# Patient Record
Sex: FEMALE | Race: WHITE
[De-identification: names, ages, dates, MRNs, and addresses within clinical notes are randomized per-mention and may not be internally consistent; named-entity substitution may affect disease eponyms.]

---

## 2019-09-23 ENCOUNTER — HOSPITAL ENCOUNTER (INPATIENT)
Dept: HOSPITAL 54 - ER | Age: 54
LOS: 3 days | Discharge: HOME | DRG: 101 | End: 2019-09-26
Attending: NURSE PRACTITIONER | Admitting: INTERNAL MEDICINE
Payer: SELF-PAY

## 2019-09-23 VITALS — SYSTOLIC BLOOD PRESSURE: 96 MMHG | DIASTOLIC BLOOD PRESSURE: 62 MMHG

## 2019-09-23 VITALS — HEIGHT: 65 IN | BODY MASS INDEX: 23.51 KG/M2 | WEIGHT: 141.12 LBS

## 2019-09-23 DIAGNOSIS — K70.10: ICD-10-CM

## 2019-09-23 DIAGNOSIS — L89.890: ICD-10-CM

## 2019-09-23 DIAGNOSIS — E83.42: ICD-10-CM

## 2019-09-23 DIAGNOSIS — G40.509: Primary | ICD-10-CM

## 2019-09-23 DIAGNOSIS — K74.60: ICD-10-CM

## 2019-09-23 DIAGNOSIS — D68.9: ICD-10-CM

## 2019-09-23 DIAGNOSIS — E44.1: ICD-10-CM

## 2019-09-23 DIAGNOSIS — D61.818: ICD-10-CM

## 2019-09-23 DIAGNOSIS — D75.89: ICD-10-CM

## 2019-09-23 DIAGNOSIS — I69.359: ICD-10-CM

## 2019-09-23 DIAGNOSIS — F10.239: ICD-10-CM

## 2019-09-23 DIAGNOSIS — G93.89: ICD-10-CM

## 2019-09-23 LAB
ALBUMIN SERPL BCP-MCNC: 3.1 G/DL (ref 3.4–5)
ALP SERPL-CCNC: 222 U/L (ref 46–116)
ALT SERPL W P-5'-P-CCNC: 49 U/L (ref 12–78)
AST SERPL W P-5'-P-CCNC: 117 U/L (ref 15–37)
BASOPHILS # BLD AUTO: 0 /CMM (ref 0–0.2)
BASOPHILS NFR BLD AUTO: 1.2 % (ref 0–2)
BILIRUB DIRECT SERPL-MCNC: 1 MG/DL (ref 0–0.2)
BILIRUB SERPL-MCNC: 2.2 MG/DL (ref 0.2–1)
BUN SERPL-MCNC: 5 MG/DL (ref 7–18)
CALCIUM SERPL-MCNC: 8.7 MG/DL (ref 8.5–10.1)
CHLORIDE SERPL-SCNC: 106 MMOL/L (ref 98–107)
CO2 SERPL-SCNC: 26 MMOL/L (ref 21–32)
CREAT SERPL-MCNC: 0.5 MG/DL (ref 0.6–1.3)
EOSINOPHIL NFR BLD AUTO: 1.5 % (ref 0–6)
ETHANOL SERPL-MCNC: 12 MG/DL (ref 0–0)
GLUCOSE SERPL-MCNC: 102 MG/DL (ref 74–106)
HCT VFR BLD AUTO: 34 % (ref 33–45)
HGB BLD-MCNC: 11.6 G/DL (ref 11.5–14.8)
LYMPHOCYTES NFR BLD AUTO: 0.4 /CMM (ref 0.8–4.8)
LYMPHOCYTES NFR BLD AUTO: 15.5 % (ref 20–44)
LYMPHOCYTES NFR BLD MANUAL: 13 % (ref 16–48)
MCHC RBC AUTO-ENTMCNC: 34 G/DL (ref 31–36)
MCV RBC AUTO: 102 FL (ref 82–100)
MONOCYTES NFR BLD AUTO: 0.3 /CMM (ref 0.1–1.3)
MONOCYTES NFR BLD AUTO: 11 % (ref 2–12)
MONOCYTES NFR BLD MANUAL: 7 % (ref 0–11)
NEUTROPHILS # BLD AUTO: 1.8 /CMM (ref 1.8–8.9)
NEUTROPHILS NFR BLD AUTO: 70.8 % (ref 43–81)
NEUTS SEG NFR BLD MANUAL: 80 % (ref 42–76)
PLATELET # BLD AUTO: 71 /CMM (ref 150–450)
POTASSIUM SERPL-SCNC: 3.3 MMOL/L (ref 3.5–5.1)
PROT SERPL-MCNC: 7.6 G/DL (ref 6.4–8.2)
RBC # BLD AUTO: 3.35 MIL/UL (ref 4–5.2)
SODIUM SERPL-SCNC: 142 MMOL/L (ref 136–145)
WBC NRBC COR # BLD AUTO: 2.6 K/UL (ref 4.3–11)

## 2019-09-23 PROCEDURE — G0480 DRUG TEST DEF 1-7 CLASSES: HCPCS

## 2019-09-23 PROCEDURE — G0378 HOSPITAL OBSERVATION PER HR: HCPCS

## 2019-09-23 RX ADMIN — LEVETIRACETAM SCH MG: 250 TABLET, FILM COATED ORAL at 23:39

## 2019-09-23 RX ADMIN — Medication PRN EACH: at 23:41

## 2019-09-23 RX ADMIN — FOLIC ACID SCH MG: 1 TABLET ORAL at 23:39

## 2019-09-23 RX ADMIN — Medication SCH MG: at 23:39

## 2019-09-23 RX ADMIN — DEXTROSE AND SODIUM CHLORIDE PRN MLS/HR: 5; 450 INJECTION, SOLUTION INTRAVENOUS at 23:39

## 2019-09-23 NOTE — NUR
"[id number] FROM [facility], PT GIVEN 5MG VERSED PER EMS." PT 
TO BED 7, PT ON MONITOR, VSS, NAD NOTED, PENDIGN MD ORR

## 2019-09-24 VITALS — DIASTOLIC BLOOD PRESSURE: 85 MMHG | SYSTOLIC BLOOD PRESSURE: 141 MMHG

## 2019-09-24 VITALS — SYSTOLIC BLOOD PRESSURE: 127 MMHG | DIASTOLIC BLOOD PRESSURE: 88 MMHG

## 2019-09-24 VITALS — DIASTOLIC BLOOD PRESSURE: 65 MMHG | SYSTOLIC BLOOD PRESSURE: 101 MMHG

## 2019-09-24 VITALS — SYSTOLIC BLOOD PRESSURE: 94 MMHG | DIASTOLIC BLOOD PRESSURE: 56 MMHG

## 2019-09-24 VITALS — SYSTOLIC BLOOD PRESSURE: 92 MMHG | DIASTOLIC BLOOD PRESSURE: 57 MMHG

## 2019-09-24 VITALS — DIASTOLIC BLOOD PRESSURE: 56 MMHG | SYSTOLIC BLOOD PRESSURE: 99 MMHG

## 2019-09-24 LAB
BASOPHILS # BLD AUTO: 0 /CMM (ref 0–0.2)
BASOPHILS NFR BLD AUTO: 0.9 % (ref 0–2)
BUN SERPL-MCNC: 5 MG/DL (ref 7–18)
CALCIUM SERPL-MCNC: 8.2 MG/DL (ref 8.5–10.1)
CHLORIDE SERPL-SCNC: 107 MMOL/L (ref 98–107)
CHOLEST SERPL-MCNC: 92 MG/DL (ref ?–200)
CO2 SERPL-SCNC: 21 MMOL/L (ref 21–32)
CREAT SERPL-MCNC: 0.6 MG/DL (ref 0.6–1.3)
EOSINOPHIL NFR BLD AUTO: 3.1 % (ref 0–6)
GLUCOSE SERPL-MCNC: 102 MG/DL (ref 74–106)
HCT VFR BLD AUTO: 34 % (ref 33–45)
HDLC SERPL-MCNC: 45 MG/DL (ref 40–60)
HGB BLD-MCNC: 11.6 G/DL (ref 11.5–14.8)
LDLC SERPL DIRECT ASSAY-MCNC: 50 MG/DL (ref 0–99)
LYMPHOCYTES NFR BLD AUTO: 0.5 /CMM (ref 0.8–4.8)
LYMPHOCYTES NFR BLD AUTO: 21.7 % (ref 20–44)
LYMPHOCYTES NFR BLD MANUAL: 24 % (ref 16–48)
MAGNESIUM SERPL-MCNC: 1.5 MG/DL (ref 1.8–2.4)
MCHC RBC AUTO-ENTMCNC: 34 G/DL (ref 31–36)
MCV RBC AUTO: 103 FL (ref 82–100)
MONOCYTES NFR BLD AUTO: 0.3 /CMM (ref 0.1–1.3)
MONOCYTES NFR BLD AUTO: 14 % (ref 2–12)
MONOCYTES NFR BLD MANUAL: 10 % (ref 0–11)
NEUTROPHILS # BLD AUTO: 1.5 /CMM (ref 1.8–8.9)
NEUTROPHILS NFR BLD AUTO: 60.3 % (ref 43–81)
NEUTS SEG NFR BLD MANUAL: 66 % (ref 42–76)
PHOSPHATE SERPL-MCNC: 3.5 MG/DL (ref 2.5–4.9)
PLATELET # BLD AUTO: 63 /CMM (ref 150–450)
POTASSIUM SERPL-SCNC: 3.3 MMOL/L (ref 3.5–5.1)
RBC # BLD AUTO: 3.35 MIL/UL (ref 4–5.2)
SODIUM SERPL-SCNC: 143 MMOL/L (ref 136–145)
TRIGL SERPL-MCNC: 32 MG/DL (ref 30–150)
TSH SERPL DL<=0.005 MIU/L-ACNC: 12.07 UIU/ML (ref 0.36–3.74)
WBC NRBC COR # BLD AUTO: 2.4 K/UL (ref 4.3–11)

## 2019-09-24 RX ADMIN — DEXTROSE MONOHYDRATE PRN MG: 50 INJECTION, SOLUTION INTRAVENOUS at 20:06

## 2019-09-24 RX ADMIN — Medication PRN EACH: at 06:02

## 2019-09-24 RX ADMIN — MAGNESIUM SULFATE IN DEXTROSE SCH MLS/HR: 10 INJECTION, SOLUTION INTRAVENOUS at 11:56

## 2019-09-24 RX ADMIN — Medication PRN EACH: at 20:05

## 2019-09-24 RX ADMIN — Medication PRN EACH: at 15:45

## 2019-09-24 RX ADMIN — LEVETIRACETAM SCH MG: 250 TABLET, FILM COATED ORAL at 08:54

## 2019-09-24 RX ADMIN — MAGNESIUM SULFATE IN DEXTROSE SCH MLS/HR: 10 INJECTION, SOLUTION INTRAVENOUS at 13:48

## 2019-09-24 RX ADMIN — ZOLPIDEM TARTRATE PRN MG: 5 TABLET, FILM COATED ORAL at 22:34

## 2019-09-24 RX ADMIN — Medication SCH MG: at 08:54

## 2019-09-24 RX ADMIN — FOLIC ACID SCH MG: 1 TABLET ORAL at 08:54

## 2019-09-24 RX ADMIN — DEXTROSE AND SODIUM CHLORIDE PRN MLS/HR: 5; 450 INJECTION, SOLUTION INTRAVENOUS at 16:39

## 2019-09-24 RX ADMIN — LEVETIRACETAM SCH MG: 250 TABLET, FILM COATED ORAL at 20:06

## 2019-09-24 NOTE — NUR
PT. IS AWAKE, ALERT AND ORIENTED X3. BREATHING EVEN AND UNLABORED ON ROOM AIR. NO DISTRESS 
NOTED PT. COMPLAINED OF HEADACHE, PAIN MEDS ADMINISTRATED. PT. WITH LEFT AC 18 GAUGE IV 
SALINE LOCK INTACT, PATENT. BED LOCKED AND IN LOWEST POSITION, SIDE RAILS UP X3, BED ALARM 
ON, CALL LIGHT WITHIN REACH. SAFETY, ASPIRATION AND SEIZURE PRECAUTIONS IMPLEMENTED AND IN 
PLACE. ALL NEEDS ATTENDED.WILL ENDORSE TO NEXT SHIFT.

## 2019-09-24 NOTE — NUR
MS RN NOTE



RECEIVED PT IN STABLE CONDITION, A/O X3. CURRENTLY IN  BED USING PHONE. NO SIGNS OF SOB OR 
DISTRESS, NO C/O PAIN, N/V. IV IN L AC #18 IN PLACE WITH IVF INFUSING. ALL CURRENT NEEDS 
ATTENDED TO. BED LOW, LOCKED, UPPER RAILS UP, SEIZURE PRECAUTIONS IN PLACE, AND CALL LIGHT 
WITHIN REACH. WILL CONT. TO MONITOR CLOSELY.

## 2019-09-24 NOTE — NUR
TELE/RN NOTES



PT. IS LYING IN BED RESTING, BREATHING EVEN AND UNLABORED ON ROOM AIR. NO SOB, RESPIRATORY 
DISTRESS OR COMPLAINTS OF PAIN NOTED AT THIS TIME. PT. WITH LEFT AC 18 GAUGE IV SALINE LOCK 
PRESENT, PATENT AND INTACT. PT. WITH EXTERNAL CARDIAC MONITOR PRESENT AND INTACT, PT. 
CURRENT RHYTHM = SINUS RHYTHM HR 65. ALL PT. NEEDS MET. BED LOCKED AND IN LOWEST POSITION, 
SIDE RAILS UP X3, BED ALARM ON, CALL LIGHT WITHIN REACH. SAFETY, ASPIRATION AND SEIZURE 
PRECAUTIONS IMPLEMENTED AND IN PLACE. WILL ENDORSE TO DAYSHIFT NURSE FOR CONTINUITY OF CARE.

## 2019-09-24 NOTE — NUR
Social service consult requested by Dr. Rios for ETOH. Pt. is a 54 year old female who was 
brought in by ambulance to the emergency department for evaluation of possible seizure and 
altered mental status. SW met with pt. bedside. Pt. is alert and oriented x 3. Pt. appears 
confused at times and is not able to provide meaningful information. Pt. states she is 
currently homeless. Pt. stated she was at [facility] and was discharged to 
a transitional housing but is unable to provide any name or address. Pt. has two sons and 
one daughter. Daughter Shannan is aware of pt's hospitalization. Pt. receives $1300 per month 
pension and stated she had her pension sent to the address where she was staying, yet unable 
to provide SW with the address. Pt. drinks a couple glasses of wine per day. Pt. is 
originally from [place] and stated she would like to move back. Pt. has a boyfriend who picked 
her up from the transitional housing but now states it is her ex-boyfriend. 



SW to discuss discharge planning with pt. tomorrow when pt. is able to recollect information 
as to where she was staying.

## 2019-09-24 NOTE — NUR
MS RN NOTE





NORCO GIVEN FOR HEADACHE 7/10 AND NAUSEA. NORCO 5/325 1 TAB PO GIVEN AND ZOFRAN 4 MG IV 
GIVEN. WILL CONT. TO MONITOR.

## 2019-09-25 VITALS — DIASTOLIC BLOOD PRESSURE: 65 MMHG | SYSTOLIC BLOOD PRESSURE: 104 MMHG

## 2019-09-25 VITALS — SYSTOLIC BLOOD PRESSURE: 115 MMHG | DIASTOLIC BLOOD PRESSURE: 60 MMHG

## 2019-09-25 VITALS — SYSTOLIC BLOOD PRESSURE: 110 MMHG | DIASTOLIC BLOOD PRESSURE: 73 MMHG

## 2019-09-25 LAB
ALBUMIN SERPL BCP-MCNC: 2.5 G/DL (ref 3.4–5)
ALP SERPL-CCNC: 193 U/L (ref 46–116)
ALT SERPL W P-5'-P-CCNC: 36 U/L (ref 12–78)
AST SERPL W P-5'-P-CCNC: 76 U/L (ref 15–37)
BASOPHILS # BLD AUTO: 0 /CMM (ref 0–0.2)
BASOPHILS NFR BLD AUTO: 1.1 % (ref 0–2)
BILIRUB SERPL-MCNC: 2.9 MG/DL (ref 0.2–1)
BUN SERPL-MCNC: 5 MG/DL (ref 7–18)
CALCIUM SERPL-MCNC: 8.3 MG/DL (ref 8.5–10.1)
CHLORIDE SERPL-SCNC: 108 MMOL/L (ref 98–107)
CO2 SERPL-SCNC: 24 MMOL/L (ref 21–32)
CREAT SERPL-MCNC: 0.6 MG/DL (ref 0.6–1.3)
EOSINOPHIL NFR BLD AUTO: 4.3 % (ref 0–6)
EOSINOPHIL NFR BLD MANUAL: 2 % (ref 0–4)
GLUCOSE SERPL-MCNC: 97 MG/DL (ref 74–106)
HCT VFR BLD AUTO: 33 % (ref 33–45)
HGB BLD-MCNC: 11.3 G/DL (ref 11.5–14.8)
LIPASE SERPL-CCNC: 84 U/L (ref 73–393)
LYMPHOCYTES NFR BLD AUTO: 0.3 /CMM (ref 0.8–4.8)
LYMPHOCYTES NFR BLD AUTO: 20.3 % (ref 20–44)
LYMPHOCYTES NFR BLD MANUAL: 21 % (ref 16–48)
MAGNESIUM SERPL-MCNC: 1.6 MG/DL (ref 1.8–2.4)
MCHC RBC AUTO-ENTMCNC: 35 G/DL (ref 31–36)
MCV RBC AUTO: 101 FL (ref 82–100)
MONOCYTES NFR BLD AUTO: 0.2 /CMM (ref 0.1–1.3)
MONOCYTES NFR BLD AUTO: 11.9 % (ref 2–12)
MONOCYTES NFR BLD MANUAL: 12 % (ref 0–11)
NEUTROPHILS # BLD AUTO: 1 /CMM (ref 1.8–8.9)
NEUTROPHILS NFR BLD AUTO: 62.4 % (ref 43–81)
NEUTS SEG NFR BLD MANUAL: 65 % (ref 42–76)
PHOSPHATE SERPL-MCNC: 3.2 MG/DL (ref 2.5–4.9)
PLATELET # BLD AUTO: 47 /CMM (ref 150–450)
POTASSIUM SERPL-SCNC: 3.5 MMOL/L (ref 3.5–5.1)
PROT SERPL-MCNC: 6.7 G/DL (ref 6.4–8.2)
RBC # BLD AUTO: 3.22 MIL/UL (ref 4–5.2)
SODIUM SERPL-SCNC: 141 MMOL/L (ref 136–145)
WBC NRBC COR # BLD AUTO: 1.7 K/UL (ref 4.3–11)

## 2019-09-25 RX ADMIN — MAGNESIUM SULFATE IN DEXTROSE SCH MLS/HR: 10 INJECTION, SOLUTION INTRAVENOUS at 12:24

## 2019-09-25 RX ADMIN — LEVETIRACETAM SCH MG: 250 TABLET, FILM COATED ORAL at 20:17

## 2019-09-25 RX ADMIN — FOLIC ACID SCH MG: 1 TABLET ORAL at 09:13

## 2019-09-25 RX ADMIN — ACETAMINOPHEN PRN MG: 325 TABLET ORAL at 13:50

## 2019-09-25 RX ADMIN — BACITRACIN ZINC SCH EA: 500 OINTMENT TOPICAL at 09:13

## 2019-09-25 RX ADMIN — ACETAMINOPHEN PRN MG: 325 TABLET ORAL at 20:17

## 2019-09-25 RX ADMIN — ZOLPIDEM TARTRATE PRN MG: 5 TABLET, FILM COATED ORAL at 21:48

## 2019-09-25 RX ADMIN — LEVETIRACETAM SCH MG: 250 TABLET, FILM COATED ORAL at 09:14

## 2019-09-25 RX ADMIN — ACETAMINOPHEN PRN MG: 325 TABLET ORAL at 00:41

## 2019-09-25 RX ADMIN — MAGNESIUM SULFATE IN DEXTROSE SCH MLS/HR: 10 INJECTION, SOLUTION INTRAVENOUS at 13:41

## 2019-09-25 RX ADMIN — Medication SCH MG: at 09:14

## 2019-09-25 NOTE — NUR
MS RN NOTES



RECEIVED PATIENT IN BED, ALERT AND AWAKE ORIENTED X3. NO SOB. DENIES ANY C/O PAIN NOR 
DISCOMFORT  AT THIS TIME. AMBULATORY WITH STEADY GAIT. RFA #22 INTACT AND PATENT INFUSING D5 
1/2 NS @ 100 ML/HR. BED IN LOWEST POSITION, LOCKED.CALL LIGHT WITHIN REACH.

## 2019-09-25 NOTE — NUR
MS RN CLOSING NOTES



 ALERT AND AWAKE ORIENTED X3. NO S/S OF RESPIRATORY DISTRESS. DENIES ANY C/O PAIN NOR 
DISCOMFORT  AT THIS TIME. SEIZURE PRECAUTIONS OBSERVED. INDEPENDENT WITH BED MOBILITY.  RFA 
#22 INTACT AND PATENT INFUSING D5 1/2 NS @ 100 ML/HR. BED IN LOWEST POSITION, LOCKED.CALL 
LIGHT WITHIN REACH. IN NO APPARENT DISTRESS.
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
## 2019-09-25 NOTE — NUR
MS RN NOTE



PT IN STABLE CONDITION, A/O X3. CURRENTLY IN  BED RESTING. NO SIGNS OF SOB OR DISTRESS, NO 
C/O PAIN, N/V. IV IN R FA #22 IN PLACE WITH IVF INFUSING. ALL CURRENT NEEDS ATTENDED TO. BED 
LOW, LOCKED, UPPER RAILS UP, SEIZURE PRECAUTIONS IN PLACE, AND CALL LIGHT WITHIN REACH. WILL 
CONT. TO MONITOR AND ENDORSE TO NEXT SHIFT FOR SONYA.

## 2019-09-25 NOTE — NUR
MS RN NOTES



RELAYED TO DR. HARPER LAB RESULTS OF WBC: 1.7 AND PLT 47 WITH NNO AT THIS TIME. PATIENT IN NO 
APPARENT DISTRESS.

## 2019-09-25 NOTE — NUR
MS RN NOTE



RECEIVED PT IN STABLE CONDITION, A/O X3. CURRENTLY IN  BED USING PHONE. NO SIGNS OF SOB OR 
DISTRESS, NO PAIN IS CURRENTLY TOLERABLE, NO C/O N/V. IV IN R FA #22 IN PLACE WITH IVF 
INFUSING. ALL CURRENT NEEDS ATTENDED TO. BED LOW, LOCKED, UPPER RAILS UP, SEIZURE 
PRECAUTIONS IN PLACE, AND CALL LIGHT WITHIN REACH. WILL CONT. TO MONITOR CLOSELY.

## 2019-09-26 VITALS — SYSTOLIC BLOOD PRESSURE: 103 MMHG | DIASTOLIC BLOOD PRESSURE: 54 MMHG

## 2019-09-26 LAB
BASOPHILS # BLD AUTO: 0 /CMM (ref 0–0.2)
BASOPHILS NFR BLD AUTO: 0.7 % (ref 0–2)
BUN SERPL-MCNC: 8 MG/DL (ref 7–18)
CALCIUM SERPL-MCNC: 8.3 MG/DL (ref 8.5–10.1)
CHLORIDE SERPL-SCNC: 108 MMOL/L (ref 98–107)
CO2 SERPL-SCNC: 23 MMOL/L (ref 21–32)
CREAT SERPL-MCNC: 0.5 MG/DL (ref 0.6–1.3)
EOSINOPHIL NFR BLD AUTO: 4.2 % (ref 0–6)
GLUCOSE SERPL-MCNC: 95 MG/DL (ref 74–106)
HCT VFR BLD AUTO: 35 % (ref 33–45)
HGB BLD-MCNC: 11.7 G/DL (ref 11.5–14.8)
LYMPHOCYTES NFR BLD AUTO: 0.4 /CMM (ref 0.8–4.8)
LYMPHOCYTES NFR BLD AUTO: 17.1 % (ref 20–44)
MAGNESIUM SERPL-MCNC: 1.5 MG/DL (ref 1.8–2.4)
MCHC RBC AUTO-ENTMCNC: 34 G/DL (ref 31–36)
MCV RBC AUTO: 101 FL (ref 82–100)
MONOCYTES NFR BLD AUTO: 0.3 /CMM (ref 0.1–1.3)
MONOCYTES NFR BLD AUTO: 12.3 % (ref 2–12)
NEUTROPHILS # BLD AUTO: 1.5 /CMM (ref 1.8–8.9)
NEUTROPHILS NFR BLD AUTO: 65.7 % (ref 43–81)
PHOSPHATE SERPL-MCNC: 3.7 MG/DL (ref 2.5–4.9)
PLATELET # BLD AUTO: 58 /CMM (ref 150–450)
POTASSIUM SERPL-SCNC: 3.8 MMOL/L (ref 3.5–5.1)
RBC # BLD AUTO: 3.41 MIL/UL (ref 4–5.2)
SODIUM SERPL-SCNC: 141 MMOL/L (ref 136–145)
WBC NRBC COR # BLD AUTO: 2.3 K/UL (ref 4.3–11)

## 2019-09-26 RX ADMIN — MAGNESIUM SULFATE IN DEXTROSE SCH MLS/HR: 10 INJECTION, SOLUTION INTRAVENOUS at 13:03

## 2019-09-26 RX ADMIN — BACITRACIN ZINC SCH EA: 500 OINTMENT TOPICAL at 09:38

## 2019-09-26 RX ADMIN — DEXTROSE MONOHYDRATE PRN MG: 50 INJECTION, SOLUTION INTRAVENOUS at 09:37

## 2019-09-26 RX ADMIN — LEVETIRACETAM SCH MG: 250 TABLET, FILM COATED ORAL at 09:39

## 2019-09-26 RX ADMIN — MAGNESIUM SULFATE IN DEXTROSE SCH MLS/HR: 10 INJECTION, SOLUTION INTRAVENOUS at 11:41

## 2019-09-26 RX ADMIN — FOLIC ACID SCH MG: 1 TABLET ORAL at 09:39

## 2019-09-26 RX ADMIN — Medication SCH MG: at 09:39

## 2019-09-26 NOTE — NUR
MS RN OPENING NOTES



RECEIVED PATIENT IN BED, ASLEEP. AROUSABLE TO VERBAL AND TACTILE STIMULI. NO SOB. DENIES ANY 
C/O PAIN NOR DISCOMFORT AT THIS TIME. RFA # 22 SL INTACT AND PATENT. PATIENT REFUSED IVF 
FLUIDS AS ORDERED. BED IN LOWEST POSITION, LOCKED. CALL LIGHT WITHIN REACH. AMBULATORY WITH 
STEADY GAIT, BRP.

## 2019-09-26 NOTE — NUR
MS RN  CLOSING/DISCHARGE NOTES



ALERT AND ORIENTED X4. DENIES ANY C/O PAIN NOR DISCOMFORT AT THIS TIME. NO SOB OBSERVED.  
DISCHARGE INSTRUCTIONS AND EDUCATION EXPLAINED WITH DISCHARGE PACKET GIVEN TO PATIENT. IV 
ACCESS REMOVED WITH CATHETER TIP INTACT WITH GAUZE DRESSING IN PLACE.  PATIENT INDEPENDENT 
WITH ADL'S . ALL BELONGINGS ACCOUNTED FOR. PATIENT REFUSED TO HAVE PICTURES TAKEN OF WOUND. 
PATIENT REFUSED TO SIGN HOMELESS WAIVER.  GAVE INFORMATION FOR RESOURCES TO 
PATIENT. TAP CARD GIVEN TO PATIENT. PATIENT DISCHARGED TO [address] [id number] [phone] WITH DIRECTIONS GIVEN TO PATIENT BY . PATIENT 
LEFT IN STABLE CONDITION. AMBULATORY WITH STEADY GAIT.

## 2019-09-26 NOTE — NUR
MS RN NOTE



PT IN STABLE CONDITION, A/O X3. CURRENTLY IN  BED USING PHONE. NO SIGNS OF SOB OR DISTRESS, 
NO PAIN IS CURRENTLY TOLERABLE, NO C/O N/V. IV IN R FA #22 IN PLACE. . ALL CURRENT NEEDS 
ATTENDED TO. BED LOW, LOCKED, UPPER RAILS UP, SEIZURE PRECAUTIONS IN PLACE, AND CALL LIGHT 
WITHIN REACH. WILL CONT. TO MONITOR CLOSELY AND ENDORSE TO NEXT SHIFT FOR SONYA.

## 2019-09-26 NOTE — NUR
JOAN met with pt. to discuss discharge plan. Pt. is alert and oriented x 4. Pt. has been 
medically cleared by the doctor for discharge. Pt. is ambulatory with a steady gait. Per RN, 
pt. was intentionally trying to make her self vomit so she wouldn't be discharged. Pt. is 
malingering and told SW yesterday she has a place to go to today since she knows where her 
pension is now. JOAN met with pt. bedside to to discuss where she is going. Pt. stated, I 
don't have anywhere to go. JOAN offered pt. shelter placement and spoke to Katarzyna at [facility] located at [address] [phone] regarding bed 
availibility. Katarzyna informed JOAN they do have  inflatable beds available. JOAN gave pt. the 
address and print out of directions to get to the shelter via bus. Pt. was also provided 
with a TAP card to get to her location. Homeless Patient waiver form was given to pt's RN to 
have pt. sign upon discharge. 



JOAN gave pt. the following homeless resources:  [facility], [address] [id number] [phone]; [facility], [address] [place] [address] [phone]; [facility] Homeless Resource Directory which includes food stamps, transitional 
housing, showers and hot meals etc; Mental Health clinics such as [facility] 
[phone]; [facility] [phone]; Health clinics;[facility] [phone] and Alcohol treatment centers such as [facility], 
[phone]; [facility] Substance Abuse Hotline [phone] and [facility] [phone].

## 2019-09-27 NOTE — NUR
WOUND CARE (LATE ENTRY):  PT WAS SEEN ON 9/24/19 FOR SKIN ASSESSMENT AND NOTED TO HAVE RT 
POSTERIOR ANKLE ESCHAR WITH PLANTAR FOOT AND TOE ABRASIONS/BLISTERS, PRESENT ON ADMISSION.  
DPM CONSULT WAS REQUESTED. PAWEL SCORE WAS 20 AT THAT TIME.

## 2020-10-27 NOTE — NUR
ms rn

received a new admission from er, patient is awake, oriented x3,came in w/ dx of seizure per 
paramedics, respirations even and unlabored,no sob noted,abdomen soft,positive bowel sounds, 
sleeping. no complain of pain at this time,all needs attended.

## 2020-10-27 NOTE — NUR
harish, found in her car, verbalized that she had a seizure off her meds for 
awhile per patient, no injury or truama, . PT AAOX2, VSS. RR EVEN & 
UNLABORED. DENIES CP, SOB, DIZZINESS, N/V AT THIS TIME. PT SEEN & EVAL'D BY DR. WALDEN. PLACED ON CARDIAC MONITOR, SR. PLACED ON SEIZURE PRECAUTION. WILL 
CONT TO MONITOR.

## 2020-10-27 NOTE — NUR
PT ASLEEP, EASILY AWAKEN BY VERBAL STIMULI & WILL GO BACK TO SLEEP. RR EVEN & 
UNLABORED. NAD NOTED AT THIS TIME. WILL CONT TO MONITOR.

## 2020-10-27 NOTE — NUR
RN OPENING NOTES 



PATIENT RECEIVED RESTING IN BED A/O X 3. STABLE ON RA WITH BREATHING EVEN AND UNLABORED, NO 
SOB NOTED. NO SIGNS OF ACUTE DISTRESS. NO COMPLAINTS OF PAIN OR DISCOMFORT AT THE MOMENT. IV 
LOCATED ON L AC #20 RUNNING D5 1/2 NS @ 75 ML/HR. SAFETY PRECAUTIONS IN PLACE WITH BED IN 
LOWEST POSITION, CALL LIGHT WITHIN REACH, BREAKS ON, SIDE RAILS UP. WILL CONTINUE TO MONITOR 
THROUGHOUT THE NIGHT.

## 2020-10-28 NOTE — NUR
TELE RN NOTES



COVID RESULTS NEGATIVE, TRANSFERRED PATIENT TO [address] [place] ROOM 328. REPORT GIVEN TO TASNEEM PAIGE. 
TRANSFERRED APTIENT VIA ACLS PROTOCOL.

## 2020-10-28 NOTE — NUR
TELE/RN NOTE



Patient expresses wanting to leave hospital. She states that "these seizures has happened to 
me 2-3 times before, and I got medication from here that helped it. I think it's best for me 
to go home. Educated patient risks and benefits of leaving against medical advice x 3, 
patient still insists of going home. Charge nurse made aware, notified MD.

## 2020-10-28 NOTE — NUR
TELE/RN NOTE



Patient expresses wanting to leave hospital. She states that "these seizures has happened to 
me 2-3 times before, and I got medication from here that helped it. I think it's best for me 
to go home. Educated patient risks and benefits of leaving against medical advice x 3, 
patient still insists of going home.

## 2020-10-28 NOTE — NUR
RN CLOSING NOTES 



PATIENT RESTING IN BED A/O X 3. STABLE ON RA WITH BREATHING EVEN AND UNLABORED, NO SOB 
NOTED. NO SIGNS OF ACUTE DISTRESS. NO COMPLAINTS OF PAIN OR DISCOMFORT AT THE MOMENT. TELE 
MONITOR READING SR WITH PAC. IV LOCATED ON  AC #20 RUNNING D5 1/2 NS @ 75 ML/HR. SAFETY 
PRECAUTIONS IN PLACE WITH BED IN LOWEST POSITION, CALL LIGHT WITHIN REACH, BREAKS ON, SIDE 
RAILS UP. ALL NEEDS ATTENDED TO. WILL ENDORSE TO ONCOMING SHIFT ABOUT SONYA.

## 2020-10-28 NOTE — NUR
TELE RN NOTES



RECEIVED PATIENT IN BED ASLEEP. AROUSABLE TO VERBAL AND TACTILE STIMULI. HOB ELEVATED. NO 
SOB. ON ROOM AIR WITH SPO2 OF 99%. DENIES ANY C/O PAIN NOR DISCOMFORT AT THIS TIME. LEFT AC 
# 20 INTACT AND PATENT INFUSING D5 1/2 NS AT 75ML/HR. AMBULATORY WITH STEADY GAIT. ABLE TO 
VERBALIZE NEEDS. BED IN LOWEST POSITION, LOCKED. CALL LIGHT WITHIN REACH.

## 2020-10-28 NOTE — NUR
TELE/RN NOTE: AMA



Patient signed AMA papers and belongings list, reinforced education about risks and benefits 
of leaving the hospital. Patient still insists of wanting to go home, states "I feel better 
and I believe that I can go home." Removed ID band, no IV access noted. Patient left 
facility safely with all belongings in hand.

## 2020-10-28 NOTE — NUR
MS/RN NOTE



Received report from Isaac BOATENG. Patient will be transferred to [facility] shortly.

## 2020-10-28 NOTE — NUR
TELE RN NOTE



Patient refused 2nd dose of KCLOR CON supplement powder packet, educated risks and benefits, 
still insists on not taking it. Wasted medication on the waste receptacle.

## 2020-10-28 NOTE — NUR
TELE/RN NOTE



Patient received in bed at room 328-1. VSS, afebrile, no SOB noted. Denies any 
pain/discomfort at this time. Breathing even and non-labored on RA, no SOB noted. No cardiac 
distress noted. On tele monitor, reading SR with PACs and PVCs 80. No IV access noted, will 
attempt to reinsert later as patient pleases, she refuses at this time since she wants to 
"rest". Sensation from all peripheral extremities intact. Fall and seizure precautions 
implemented. Will continue to monitor closely.

## 2022-06-19 NOTE — NUR
--IV pantoprazole     TELE/RN NOTES



RECEIVED PT. FROM ER VIA WHEELCHAIR. PT. IS AWAKE, ALERT AND ORIENTED X3. BREATHING EVEN AND 
UNLABORED ON ROOM AIR. NO SOB OR RESPIRATORY DISTRESS NOTED AT THIS TIME. PT. COMPLAINING OF 
HEADACHE, WILL ADMINISTER TO PT. PAIN MEDICATION AS ORDERED. PT. WITH LEFT AC 18 GAUGE IV 
SALINE LOCK PRESENT, PATENT AND INTACT. PLACED EXTERNAL CARDIAC MONITOR ON PT. CURRENT 
RHYTHM = SINUS RHYTHM HR 67. BED LOCKED AND IN LOWEST POSITION, SIDE RAILS UP X3, BED ALARM 
ON, CALL LIGHT WITHIN REACH. SAFETY, ASPIRATION AND SEIZURE PRECAUTIONS IMPLEMENTED AND IN 
PLACE. WILL CONTINUE TO MONITOR.